# Patient Record
Sex: MALE | Race: BLACK OR AFRICAN AMERICAN | NOT HISPANIC OR LATINO | Employment: OTHER | URBAN - METROPOLITAN AREA
[De-identification: names, ages, dates, MRNs, and addresses within clinical notes are randomized per-mention and may not be internally consistent; named-entity substitution may affect disease eponyms.]

---

## 2017-10-24 ENCOUNTER — TELEPHONE (OUTPATIENT)
Dept: NEUROLOGY | Facility: HOSPITAL | Age: 72
End: 2017-10-24

## 2017-10-24 NOTE — TELEPHONE ENCOUNTER
Message placed on voicemail to contact this caller.  Attempt to schedule GI appt. Cd of video capsule required.

## 2017-10-26 ENCOUNTER — TELEPHONE (OUTPATIENT)
Dept: NEUROLOGY | Facility: HOSPITAL | Age: 72
End: 2017-10-26

## 2017-10-26 NOTE — TELEPHONE ENCOUNTER
Pt notified cd of video capsule study required.  Pt will request cd from [facility].  Clinic appt scheduled with pt on Wednesday, November 1, 2017 at 215pm.  Pt to call this clinic with verification of receipt of cd. Clinic address and telephone number given to pt.  Pt repeated information correctly.

## 2017-10-26 NOTE — TELEPHONE ENCOUNTER
----- Message from Ade Mackenzie sent at 10/25/2017  3:31 PM CDT -----  Contact: Patient  GI- Patient returning a call and was informed to obtain the CD capsule study. Please call the patient back at [phone].

## 2017-10-31 ENCOUNTER — TELEPHONE (OUTPATIENT)
Dept: NEUROLOGY | Facility: HOSPITAL | Age: 72
End: 2017-10-31

## 2017-10-31 NOTE — TELEPHONE ENCOUNTER
----- Message from Shana Ballard sent at 10/31/2017 10:06 AM CDT -----  GI-Patient is calling back in regards to the Pill Cam video. Please call back on his cell at [phone].

## 2017-10-31 NOTE — TELEPHONE ENCOUNTER
----- Message from Valeria Rascon sent at 10/31/2017  9:29 AM CDT -----  Contact: Patient  GI- Patient would like to speak to nurse regarding cds. Call [phone]

## 2017-10-31 NOTE — TELEPHONE ENCOUNTER
CD of capsule study not available, only pictures.  Sherlyn with Dr. Dunn's office number given by pt of [phone].  Per Sherlyn, pictures are available only.

## 2017-10-31 NOTE — TELEPHONE ENCOUNTER
Pt having difficulty obtaining cd of pill study.  Pt to contact Dr. Vazquez and Dr. Acevedo again and call this clinic later with information.

## 2017-11-01 ENCOUNTER — TELEPHONE (OUTPATIENT)
Dept: NEUROLOGY | Facility: HOSPITAL | Age: 72
End: 2017-11-01

## 2017-11-01 NOTE — TELEPHONE ENCOUNTER
----- Message from Nelly Ruiz sent at 11/1/2017  9:38 AM CDT -----  Contact: Patient  GI:  Patient was not sure if the CD (or images) had gotten here that was needed for his appt.  Patient states he is not feeling well and would prefer to reschedule either way.  Patient is on schedule for 11/8 @ 1500.  Patient can be reached at [phone].  Thank you  abd

## 2017-11-02 ENCOUNTER — DOCUMENTATION ONLY (OUTPATIENT)
Dept: GASTROENTEROLOGY | Facility: HOSPITAL | Age: 72
End: 2017-11-02

## 2017-11-03 ENCOUNTER — TELEPHONE (OUTPATIENT)
Dept: NEUROLOGY | Facility: HOSPITAL | Age: 72
End: 2017-11-03

## 2017-11-03 NOTE — TELEPHONE ENCOUNTER
----- Message from Danny Juan MD sent at 11/2/2017 11:24 PM CDT -----  I was able to review this patient's capsule study so he doesn't need to bring a copy.  You can schedule him for clinic now. Thanks!     ----- Message -----  From: Aurea Dumont LPN  Sent: 11/1/2017  10:53 AM  To: Danny Juan MD    Pt unable to obtain cd of capsule study from [facility].  This is a reminder to get a GI resident to burn cd.

## 2017-11-03 NOTE — TELEPHONE ENCOUNTER
Pt notified cd of video capsule obtained and viewed by Dr. Juan.  Clinic appt confirmed with pt on Wednesday, November 8, 2017 at 3pm.  Pt is awaiting an appt for knee pain and may have to reschedule this appt.  Pt advised to call this clinic at [phone] if he has to reschedule.  Pt acknowledged understanding.

## 2017-11-03 NOTE — PROGRESS NOTES
Capsule images reviewed independently. This patient has multiple classic angioectasias in the proximal and mid small bowel (~5-6). Will plan short upper DBE likely at next visit

## 2017-11-08 ENCOUNTER — OFFICE VISIT (OUTPATIENT)
Dept: NEUROLOGY | Facility: HOSPITAL | Age: 72
End: 2017-11-08
Attending: INTERNAL MEDICINE
Payer: MEDICARE

## 2017-11-08 VITALS
HEIGHT: 70 IN | TEMPERATURE: 99 F | DIASTOLIC BLOOD PRESSURE: 48 MMHG | SYSTOLIC BLOOD PRESSURE: 84 MMHG | BODY MASS INDEX: 29.76 KG/M2 | HEART RATE: 85 BPM | WEIGHT: 207.88 LBS

## 2017-11-08 DIAGNOSIS — D50.0 ANEMIA DUE TO CHRONIC BLOOD LOSS: Primary | ICD-10-CM

## 2017-11-08 PROCEDURE — 99213 OFFICE O/P EST LOW 20 MIN: CPT | Performed by: INTERNAL MEDICINE

## 2017-11-08 RX ORDER — ENOXAPARIN SODIUM 100 MG/ML
100 INJECTION SUBCUTANEOUS EVERY 12 HOURS
Qty: 10 ML | Refills: 0 | Status: SHIPPED | OUTPATIENT
Start: 2017-11-08 | End: 2017-11-13

## 2017-11-08 RX ORDER — TRAMADOL HYDROCHLORIDE 50 MG/1
TABLET ORAL
COMMUNITY
Start: 2017-10-25

## 2017-11-08 RX ORDER — CEPHALEXIN 500 MG/1
500 CAPSULE ORAL 3 TIMES DAILY
Refills: 2 | COMMUNITY
Start: 2017-09-21 | End: 2017-11-08

## 2017-11-08 NOTE — PATIENT INSTRUCTIONS
You are scheduled for an Upper SBE on __Monday, December 4, 2017_______________________________    You should eat light meals the day before the procedure and nothing to eat or drink after midnight the night before your procedure.    You will need to be at the 1st floor admission desk at the hospital on _endoscopy staff to contact with arrival time.      Hold Plavix 5 days before procedure(last dose on Wednesday, 11/29/17)    Place 1 injection of Lovenox subqutaneous 5 days before procedure and 5 days after procedure(do not give injection on day of procedure)

## 2017-11-08 NOTE — PROGRESS NOTES
"[facility]    CC: anemia     HPI 72 y.o. male h/o Afib with pacemaker on coumadin, CAD s/p CABG (), tubular adenomas s/p right partial colectomy who was hospitalized for asymptomatic acute blood loss anemia with occult positive stool in 2017 with transfusion of 2 units of blood and iron. He was referred due to negative EGD 2017 by Dr. Vazquez. VCE reviewed 17 with 5-6 classic AVMs in the proximal to mid- small bowel. He was taking Pradaxa 2-3 years prior to the GI bleed in 2017 and then switched to coumadin for cost. He denies hematochezia, melena, GUAN, SOB, or chest pain. He has never needed a blood transfusion before.     He has a h/o multiple tubular adenomas with annual surveillance with a TI colon polyp necessitating right hemicolectomy 2016. He denies family h/o colon cancer or polyps. His mother  at 83 of gastric cancer.     Outside records reviewed and summarized above. Collateral information provided by his significant other.     Past Medical History:   Hypertension   Afib   History of cardiac pacemaker     Social History: no VALERIANO abuse   Family History: negative for IBD or CRC     Review of Systems  General ROS: negative for chills, fever or weight loss  Ophthalmic ROS: negative for blurry vision, photophobia or eye pain  ENT ROS: negative for epistaxis, sore throat or rhinorrhea  Respiratory ROS: no cough, shortness of breath, or wheezing  Cardiovascular ROS: no chest pain or dyspnea on exertion  Gastrointestinal ROS: no abdominal pain, change in bowel habits, or black/ bloody stools  Genito-Urinary ROS: no dysuria, trouble voiding, or hematuria  Musculoskeletal ROS: negative for muscular weakness; positive arthritis   Neurological ROS: no syncope or seizures; no ataxia  Dermatological ROS: negative for pruritis, rash and jaundice    Physical Examination  BP (!) 84/48   Pulse 85   Temp 98.5 °F (36.9 °C) (Oral)   Ht 5' 10" (1.778 m)   Wt 94.3 kg (207 lb 14.3 oz)   BMI 29.83 " kg/m²   General appearance: alert, cooperative, no distress  HENT: Normocephalic, atraumatic, neck symmetrical, no nasal discharge   Eyes: conjunctivae/corneas clear, PERRL, EOM's intact  Lungs: clear to auscultation bilaterally, no dullness to percussion bilaterally  Heart: regular rate and rhythm without rub; no displacement of the PMI + pacemaker   Abdomen: soft, non-tender; bowel sounds normoactive; no organomegaly  Extremities: extremities symmetric; no clubbing, cyanosis, or edema  Integument: Skin color, texture, turgor normal; no rashes; hair distrubution normal + healed sternum and abdominal surgical scar  Neurologic: Alert and oriented X 3, normal strength, normal coordination and gait  Psychiatric: no pressured speech; normal affect; no evidence of impaired cognition     Labs:  Hb 10.7  Plt 347  INR 1.8    Imaging:  VCE 11/2/17  Multiple classic angioectasias in the proximal and mid small bowel (~5-6).    Capsule images reviewed independently    Assessment: 72 y.o. h/o Afib with pacemaker on coumadin, CAD s/p CABG (2007), tubular adenomas s/p right partial colectomy who was hospitalized for asymptomatic acute blood loss anemia with occult positive stool in 8/2017 with transfusion of 2 units of blood and iron. VCE shows multiple proximal to mid- small bowel angioectasia w/o overt GI bleeding. Currently not on iron.    Plan:   - I have requested the most recent labs done at [facility] on 11/8/17 to assess need for iron therapy.   - I will plan for a short upper DBE on 12/4/17 with APC of angioectasia in the proximal to mid- small bowel. He will need to hold coumadin for 5 days with a Lovenox bridge. Prescription for Lovenox dispensed.   - If the patient has continued iron deficient anemia after DBE then he will need a trial of iv iron replacement to offset his blood loss.     Patient referred by Dr. Mick Juan MD   [address] [place] [address], [address]   [place] [address] [phone]

## 2017-11-09 ENCOUNTER — TELEPHONE (OUTPATIENT)
Dept: NEUROLOGY | Facility: HOSPITAL | Age: 72
End: 2017-11-09

## 2017-11-09 NOTE — TELEPHONE ENCOUNTER
Pt would like clarification of 1.0m Lovenox written on yesterday.  Previously taking 0.ml Lovenox written by Dr. Dunn.  Pt to be contacted with clarification.

## 2017-11-09 NOTE — TELEPHONE ENCOUNTER
Pt given clarification of lovenox 1ml, inject sq once every 12 hours, but not on the day of procedure. Pt acknowledged understanding.

## 2017-11-21 ENCOUNTER — TELEPHONE (OUTPATIENT)
Dept: NEUROLOGY | Facility: HOSPITAL | Age: 72
End: 2017-11-21

## 2017-11-21 NOTE — TELEPHONE ENCOUNTER
----- Message from Aurea Dumont LPN sent at 11/8/2017  4:11 PM CST -----  Upper SBE on 12/4/17.  CPT 72919, [id number].    Thanks.

## 2017-12-04 ENCOUNTER — ANESTHESIA (OUTPATIENT)
Dept: ENDOSCOPY | Facility: HOSPITAL | Age: 72
End: 2017-12-04
Payer: MEDICARE

## 2017-12-04 ENCOUNTER — SURGERY (OUTPATIENT)
Age: 72
End: 2017-12-04

## 2017-12-04 ENCOUNTER — HOSPITAL ENCOUNTER (OUTPATIENT)
Facility: HOSPITAL | Age: 72
Discharge: HOME | End: 2017-12-04
Attending: INTERNAL MEDICINE | Admitting: INTERNAL MEDICINE
Payer: MEDICARE

## 2017-12-04 ENCOUNTER — ANESTHESIA EVENT (OUTPATIENT)
Dept: ENDOSCOPY | Facility: HOSPITAL | Age: 72
End: 2017-12-04
Payer: MEDICARE

## 2017-12-04 VITALS
SYSTOLIC BLOOD PRESSURE: 105 MMHG | WEIGHT: 207 LBS | OXYGEN SATURATION: 97 % | HEIGHT: 70 IN | DIASTOLIC BLOOD PRESSURE: 58 MMHG | HEART RATE: 93 BPM | BODY MASS INDEX: 29.63 KG/M2 | RESPIRATION RATE: 13 BRPM | TEMPERATURE: 98 F

## 2017-12-04 DIAGNOSIS — Z01.818 PREOP EXAMINATION: ICD-10-CM

## 2017-12-04 DIAGNOSIS — D50.0 BLOOD LOSS ANEMIA: ICD-10-CM

## 2017-12-04 DIAGNOSIS — K55.20 ANGIODYSPLASIA OF INTESTINE: ICD-10-CM

## 2017-12-04 PROCEDURE — 27202087 HC PROBE, APC: Performed by: INTERNAL MEDICINE

## 2017-12-04 PROCEDURE — 63600175 PHARM REV CODE 636 W HCPCS: Performed by: NURSE ANESTHETIST, CERTIFIED REGISTERED

## 2017-12-04 PROCEDURE — 27201238 HC BALLOON, OVERTUBE (ANY): Performed by: INTERNAL MEDICINE

## 2017-12-04 PROCEDURE — 25000003 PHARM REV CODE 250: Performed by: NURSE ANESTHETIST, CERTIFIED REGISTERED

## 2017-12-04 PROCEDURE — 44378 SMALL BOWEL ENDOSCOPY: CPT | Performed by: INTERNAL MEDICINE

## 2017-12-04 PROCEDURE — 25000003 PHARM REV CODE 250: Performed by: INTERNAL MEDICINE

## 2017-12-04 PROCEDURE — 37000009 HC ANESTHESIA EA ADD 15 MINS: Performed by: INTERNAL MEDICINE

## 2017-12-04 PROCEDURE — 93005 ELECTROCARDIOGRAM TRACING: CPT

## 2017-12-04 PROCEDURE — 25000242 PHARM REV CODE 250 ALT 637 W/ HCPCS: Performed by: NURSE ANESTHETIST, CERTIFIED REGISTERED

## 2017-12-04 PROCEDURE — 37000008 HC ANESTHESIA 1ST 15 MINUTES: Performed by: INTERNAL MEDICINE

## 2017-12-04 RX ORDER — SODIUM CHLORIDE 0.9 % (FLUSH) 0.9 %
3 SYRINGE (ML) INJECTION
Status: DISCONTINUED | OUTPATIENT
Start: 2017-12-04 | End: 2017-12-04 | Stop reason: HOSPADM

## 2017-12-04 RX ORDER — HYDROMORPHONE HYDROCHLORIDE 2 MG/ML
0.2 INJECTION, SOLUTION INTRAMUSCULAR; INTRAVENOUS; SUBCUTANEOUS EVERY 5 MIN PRN
Status: DISCONTINUED | OUTPATIENT
Start: 2017-12-04 | End: 2017-12-04 | Stop reason: HOSPADM

## 2017-12-04 RX ORDER — LIDOCAINE HCL/PF 100 MG/5ML
SYRINGE (ML) INTRAVENOUS
Status: DISCONTINUED | OUTPATIENT
Start: 2017-12-04 | End: 2017-12-04

## 2017-12-04 RX ORDER — ROCURONIUM BROMIDE 10 MG/ML
INJECTION, SOLUTION INTRAVENOUS
Status: DISCONTINUED | OUTPATIENT
Start: 2017-12-04 | End: 2017-12-04

## 2017-12-04 RX ORDER — FENTANYL CITRATE 50 UG/ML
INJECTION, SOLUTION INTRAMUSCULAR; INTRAVENOUS
Status: DISCONTINUED | OUTPATIENT
Start: 2017-12-04 | End: 2017-12-04

## 2017-12-04 RX ORDER — ONDANSETRON HYDROCHLORIDE 2 MG/ML
INJECTION, SOLUTION INTRAMUSCULAR; INTRAVENOUS
Status: DISCONTINUED | OUTPATIENT
Start: 2017-12-04 | End: 2017-12-04

## 2017-12-04 RX ORDER — SUCCINYLCHOLINE CHLORIDE 20 MG/ML
INJECTION INTRAMUSCULAR; INTRAVENOUS
Status: DISCONTINUED | OUTPATIENT
Start: 2017-12-04 | End: 2017-12-04

## 2017-12-04 RX ORDER — ONDANSETRON 2 MG/ML
4 INJECTION INTRAMUSCULAR; INTRAVENOUS DAILY PRN
Status: DISCONTINUED | OUTPATIENT
Start: 2017-12-04 | End: 2017-12-04 | Stop reason: HOSPADM

## 2017-12-04 RX ORDER — MIDAZOLAM HYDROCHLORIDE 1 MG/ML
INJECTION INTRAMUSCULAR; INTRAVENOUS
Status: DISCONTINUED | OUTPATIENT
Start: 2017-12-04 | End: 2017-12-04

## 2017-12-04 RX ORDER — PROPOFOL 10 MG/ML
VIAL (ML) INTRAVENOUS
Status: DISCONTINUED | OUTPATIENT
Start: 2017-12-04 | End: 2017-12-04

## 2017-12-04 RX ORDER — SODIUM CHLORIDE 9 MG/ML
INJECTION, SOLUTION INTRAVENOUS CONTINUOUS
Status: DISCONTINUED | OUTPATIENT
Start: 2017-12-04 | End: 2017-12-04 | Stop reason: HOSPADM

## 2017-12-04 RX ORDER — PHENYLEPHRINE HYDROCHLORIDE 10 MG/ML
INJECTION INTRAVENOUS
Status: DISCONTINUED | OUTPATIENT
Start: 2017-12-04 | End: 2017-12-04

## 2017-12-04 RX ORDER — ALBUTEROL SULFATE 90 UG/1
AEROSOL, METERED RESPIRATORY (INHALATION)
Status: DISCONTINUED | OUTPATIENT
Start: 2017-12-04 | End: 2017-12-04

## 2017-12-04 RX ADMIN — EPHEDRINE SULFATE 10 MG: 50 INJECTION, SOLUTION INTRAMUSCULAR; INTRAVENOUS; SUBCUTANEOUS at 10:12

## 2017-12-04 RX ADMIN — PHENYLEPHRINE HYDROCHLORIDE 200 MCG: 10 INJECTION INTRAVENOUS at 10:12

## 2017-12-04 RX ADMIN — SODIUM CHLORIDE: 0.9 INJECTION, SOLUTION INTRAVENOUS at 09:12

## 2017-12-04 RX ADMIN — PROPOFOL 150 MG: 10 INJECTION, EMULSION INTRAVENOUS at 10:12

## 2017-12-04 RX ADMIN — ALBUTEROL SULFATE 2 PUFF: 90 AEROSOL, METERED RESPIRATORY (INHALATION) at 10:12

## 2017-12-04 RX ADMIN — MIDAZOLAM HYDROCHLORIDE 2 MG: 1 INJECTION, SOLUTION INTRAMUSCULAR; INTRAVENOUS at 10:12

## 2017-12-04 RX ADMIN — FENTANYL CITRATE 100 MCG: 50 INJECTION, SOLUTION INTRAMUSCULAR; INTRAVENOUS at 10:12

## 2017-12-04 RX ADMIN — ROCURONIUM BROMIDE 5 MG: 10 INJECTION, SOLUTION INTRAVENOUS at 10:12

## 2017-12-04 RX ADMIN — ONDANSETRON 4 MG: 2 INJECTION, SOLUTION INTRAMUSCULAR; INTRAVENOUS at 10:12

## 2017-12-04 RX ADMIN — LIDOCAINE HYDROCHLORIDE 75 MG: 20 INJECTION, SOLUTION INTRAVENOUS at 10:12

## 2017-12-04 RX ADMIN — SUCCINYLCHOLINE CHLORIDE 120 MG: 20 INJECTION, SOLUTION INTRAMUSCULAR; INTRAVENOUS at 10:12

## 2017-12-04 NOTE — ANESTHESIA POSTPROCEDURE EVALUATION
"Anesthesia Post Evaluation    Patient: Te Zuluaga    Procedure(s) Performed: Procedure(s) (LRB):  Short upper DBE (N/A)    Final Anesthesia Type: general  Patient location during evaluation: PACU  Patient participation: Yes- Able to Participate  Level of consciousness: awake and alert  Post-procedure vital signs: reviewed and stable  Pain management: adequate  Airway patency: patent  PONV status at discharge: No PONV  Anesthetic complications: no      Cardiovascular status: blood pressure returned to baseline and hemodynamically stable  Respiratory status: spontaneous ventilation, unassisted and room air  Hydration status: euvolemic  Follow-up not needed.        Visit Vitals  BP (!) 105/58   Pulse 93   Temp 36.8 °C (98.2 °F)   Resp 13   Ht 5' 10" (1.778 m)   Wt 93.9 kg (207 lb)   SpO2 97%   BMI 29.70 kg/m²       Pain/Alana Score: Pain Assessment Performed: Yes (12/4/2017 12:26 PM)  Presence of Pain: denies (12/4/2017 12:26 PM)  Alana Score: 10 (12/4/2017 12:26 PM)      "

## 2017-12-04 NOTE — OR NURSING
Patient intubated per anesthesia, tolerated well, VSS. Warming blanket applied. Patient  positioned for procedure. Bite block placed.

## 2017-12-04 NOTE — DISCHARGE INSTRUCTIONS
Post Small Bowel Enteroscopy (Antegrade) Discharge Instructions:    Te Zuluaga  12/4/2017  Danny Juan MD    1. Diet: Try sips of water first. If tolerated, resume your regular diet or one recommended by your physician.  2. Do not drive a car or operate machinery, make critical decisions, or do activities that require coordination or balance for 24 hours.  3. You may experience a sore throat for 24 to 48 hours. You may use throat lozenges or gargle with warm, salt water to relieve the discomfort.  4. Because air was put into your stomach/bowel during the procedure, you may experience some belching.  5. Go directly to the emergency room if you notice any of the following:                              Chills and/or fever over 101                Persistent vomiting or vomiting with blood/nasal regurgitation                Severe abdominal pain, other than gas cramps                Severe chest pain                Black, tarry stools    If you have any questions or problems, please call your Physician:    Danny Juan MD      If a complication or emergency situation arises and you are unable to reach your Physician - GO TO THE EMERGENCY ROOM.

## 2017-12-04 NOTE — TRANSFER OF CARE
"Anesthesia Transfer of Care Note    Patient: Te Zuluaga    Procedure(s) Performed: Procedure(s) (LRB):  Short upper DBE (N/A)    Patient location: PACU    Anesthesia Type: general    Transport from OR: Transported from OR on 6-10 L/min O2 by face mask with adequate spontaneous ventilation    Post pain: adequate analgesia    Post assessment: no apparent anesthetic complications    Post vital signs: stable    Level of consciousness: responds to stimulation    Nausea/Vomiting: no nausea/vomiting    Complications: none    Transfer of care protocol was followed      Last vitals:   Visit Vitals  /71 (BP Location: Right arm, Patient Position: Lying)   Pulse 90   Temp 36.8 °C (98.2 °F) (Oral)   Resp 20   Ht 5' 10" (1.778 m)   Wt 93.9 kg (207 lb)   SpO2 98%   BMI 29.70 kg/m²     "

## 2017-12-04 NOTE — H&P
[facility]     CC: anemia      HPI 72 y.o. male h/o Afib with pacemaker on coumadin, CAD s/p CABG (), tubular adenomas s/p right partial colectomy who was hospitalized for asymptomatic acute blood loss anemia with occult positive stool in 2017 with transfusion of 2 units of blood and iron. He was referred due to negative EGD 2017 by Dr. Vazquez. VCE reviewed 17 with 5-6 classic AVMs in the proximal to mid- small bowel. He was taking Pradaxa 2-3 years prior to the GI bleed in 2017 and then switched to coumadin for cost. He denies hematochezia, melena, GUAN, SOB, or chest pain. He has never needed a blood transfusion before.      He has a h/o multiple tubular adenomas with annual surveillance with a TI colon polyp necessitating right hemicolectomy 2016. He denies family h/o colon cancer or polyps. His mother  at 83 of gastric cancer.      Outside records reviewed and summarized above. Collateral information provided by his significant other.      Past Medical History:   Hypertension   Afib   History of cardiac pacemaker      Social History: no VALERIANO abuse   Family History: negative for IBD or CRC      Review of Systems  General ROS: negative for chills, fever or weight loss  Ophthalmic ROS: negative for blurry vision, photophobia or eye pain  ENT ROS: negative for epistaxis, sore throat or rhinorrhea  Respiratory ROS: no cough, shortness of breath, or wheezing  Cardiovascular ROS: no chest pain or dyspnea on exertion  Gastrointestinal ROS: no abdominal pain, change in bowel habits, or black/ bloody stools  Genito-Urinary ROS: no dysuria, trouble voiding, or hematuria  Musculoskeletal ROS: negative for muscular weakness; positive arthritis   Neurological ROS: no syncope or seizures; no ataxia  Dermatological ROS: negative for pruritis, rash and jaundice     Physical Examination  Vitals reviewed  General appearance: alert, cooperative, no distress  HENT: Normocephalic, atraumatic, neck  symmetrical, no nasal discharge   Eyes: conjunctivae/corneas clear, PERRL, EOM's intact  Lungs: clear to auscultation bilaterally, no dullness to percussion bilaterally  Heart: regular rate and rhythm without rub; no displacement of the PMI + pacemaker   Abdomen: soft, non-tender; bowel sounds normoactive; no organomegaly  Extremities: extremities symmetric; no clubbing, cyanosis, or edema  Integument: Skin color, texture, turgor normal; no rashes; hair distrubution normal + healed sternum and abdominal surgical scar  Neurologic: Alert and oriented X 3, normal strength, normal coordination and gait  Psychiatric: no pressured speech; normal affect; no evidence of impaired cognition      Labs:  Hb 10.7  Plt 347  INR 1.8     Imaging:  VCE 11/2/17  Multiple classic angioectasias in the proximal and mid small bowel (~5-6).     Capsule images reviewed independently     Assessment: 72 y.o. h/o Afib with pacemaker on coumadin, CAD s/p CABG (2007), tubular adenomas s/p right partial colectomy who was hospitalized for asymptomatic acute blood loss anemia with occult positive stool in 8/2017 with transfusion of 2 units of blood and iron. VCE shows multiple proximal to mid- small bowel angioectasia w/o overt GI bleeding. Currently not on iron.     Plan:   - I will plan for a short upper DBE with APC of angioectasia in the proximal to mid- small bowel. Held coumadin for 5 days with a Lovenox bridge. Recommendations to resume anticoagulation to follow procedure.  - Will review outside lab iron studies to determine need for iron therapy. If the patient has continued iron deficient anemia after DBE then he will need a trial of iv iron replacement to offset his blood loss.      Patient referred by Dr. Mick Juan MD   [address] [facility], [address]   [place] [address] [phone]

## 2017-12-04 NOTE — ANESTHESIA PREPROCEDURE EVALUATION
12/04/2017  Te Zuluaga is a 72 y.o., male for short upper DBE under GETA    Past Medical History:   Diagnosis Date    Anemia     Cataracts, bilateral     Chest pain at rest     Claudication     Clotting disorder     Hypertension     PAF (paroxysmal atrial fibrillation)          Anesthesia Evaluation     I have reviewed the Nursing Notes.   I have reviewed the Medications.     Review of Systems  Social:  Former Smoker, No Alcohol Use   Hematology/Oncology:         -- Anemia:   Cardiovascular:   Exercise tolerance: good Pacemaker (PM) Hypertension CABG/stent (CABG) Dysrhythmias atrial fibrillation PVD hyperlipidemia ECG has been reviewed.        Physical Exam  General:  Well nourished    Airway/Jaw/Neck:  Airway Findings: Mouth Opening: Normal Tongue: Normal  General Airway Assessment: Adult  Mallampati: III  TM Distance: Normal, at least 6 cm  Jaw/Neck Findings:  Neck ROM: Normal ROM      Dental:  Dental Findings: Periodontal disease, Severe, Upper Dentures   Chest/Lungs:  Chest/Lungs Clear    Heart/Vascular:  Heart Findings: Rate: Normal  Rhythm: Irregularly Irregular        Mental Status:  Mental Status Findings:  Alert and Oriented       Lab Results   Component Value Date    WBC 8.3 09/29/2017    HGB 10.1 (L) 09/29/2017    HCT 32.1 (L) 09/29/2017     09/29/2017    CHOL 99 (L) 03/07/2017    TRIG 129 03/07/2017    HDL 33 (L) 03/07/2017    ALT 15 07/25/2017    AST 19 07/25/2017     07/25/2017    K 4.2 07/25/2017    CL 97 (L) 07/25/2017    CREATININE 1.25 (H) 07/25/2017    BUN 10 07/25/2017    CO2 27 07/25/2017    TSH 1.31 03/07/2017    INR 1.8 (H) 10/02/2017     6/2016 echo  Left ventricle is upper limits on normal in size.  Left ventricle has normal systolic function with an ejection  fraction of 56%  There are no obvious wall motion abnormalities.  The left atrium is dilated  measuring 5.3 cm in size.  Patient has pacemaker with an underlying rhythm of a-fib.  The mitral valve appears normal . There is mild mitral regurgitation  The aortic valve is mildly sclerotic but not stenotic . There is no  aortic regurgitation.  The right ventricle is normal in size and function.  There is mild tricuspid regurgitation with an estimated pulmonary  pressure of 24 mmHg.  The pulmonic valve was visualized and appears to have normal   anatomy. No pulmonary insuffiencey noted.  No pericardial or pleural effusion present.    Anesthesia Plan  Type of Anesthesia, risks & benefits discussed:  Anesthesia Type:  general  Patient's Preference:   Intra-op Monitoring Plan:   Intra-op Monitoring Plan Comments:   Post Op Pain Control Plan:   Post Op Pain Control Plan Comments:   Induction:    Beta Blocker:  Patient is not currently on a Beta-Blocker (No further documentation required).       Informed Consent: Patient understands risks and agrees with Anesthesia plan.  Questions answered. Anesthesia consent signed with patient.  ASA Score: 3     Day of Surgery Review of History & Physical:            Ready For Surgery From Anesthesia Perspective.